# Patient Record
Sex: FEMALE | Race: WHITE
[De-identification: names, ages, dates, MRNs, and addresses within clinical notes are randomized per-mention and may not be internally consistent; named-entity substitution may affect disease eponyms.]

---

## 2024-07-22 PROBLEM — Z00.00 ENCOUNTER FOR PREVENTIVE HEALTH EXAMINATION: Status: ACTIVE | Noted: 2024-07-22

## 2024-07-23 ENCOUNTER — APPOINTMENT (OUTPATIENT)
Dept: NEUROLOGY | Facility: CLINIC | Age: 44
End: 2024-07-23
Payer: MEDICAID

## 2024-07-23 VITALS
HEIGHT: 58 IN | SYSTOLIC BLOOD PRESSURE: 123 MMHG | DIASTOLIC BLOOD PRESSURE: 88 MMHG | OXYGEN SATURATION: 99 % | WEIGHT: 183 LBS | HEART RATE: 76 BPM | BODY MASS INDEX: 38.41 KG/M2

## 2024-07-23 DIAGNOSIS — R56.9 UNSPECIFIED CONVULSIONS: ICD-10-CM

## 2024-07-23 DIAGNOSIS — E66.9 OBESITY, UNSPECIFIED: ICD-10-CM

## 2024-07-23 DIAGNOSIS — Z86.73 PERSONAL HISTORY OF TRANSIENT ISCHEMIC ATTACK (TIA), AND CEREBRAL INFARCTION W/OUT RESIDUAL DEFICITS: ICD-10-CM

## 2024-07-23 DIAGNOSIS — R41.0 DISORIENTATION, UNSPECIFIED: ICD-10-CM

## 2024-07-23 DIAGNOSIS — I10 ESSENTIAL (PRIMARY) HYPERTENSION: ICD-10-CM

## 2024-07-23 DIAGNOSIS — G44.86 CERVICOGENIC HEADACHE: ICD-10-CM

## 2024-07-23 PROCEDURE — G2211 COMPLEX E/M VISIT ADD ON: CPT | Mod: NC,1L

## 2024-07-23 PROCEDURE — 99205 OFFICE O/P NEW HI 60 MIN: CPT

## 2024-07-23 RX ORDER — METHYLPREDNISOLONE 4 MG/1
4 TABLET ORAL
Qty: 1 | Refills: 0 | Status: ACTIVE | COMMUNITY
Start: 2024-07-23 | End: 1900-01-01

## 2024-07-23 RX ORDER — AMLODIPINE AND VALSARTAN 10; 160 MG/1; MG/1
10-160 TABLET, FILM COATED ORAL DAILY
Qty: 30 | Refills: 0 | Status: ACTIVE | COMMUNITY
Start: 2024-07-23

## 2024-07-23 RX ORDER — METHOCARBAMOL 500 MG/1
500 TABLET, FILM COATED ORAL
Qty: 60 | Refills: 0 | Status: ACTIVE | COMMUNITY
Start: 2024-07-23 | End: 1900-01-01

## 2024-07-23 RX ORDER — SEMAGLUTIDE 0.68 MG/ML
2 INJECTION, SOLUTION SUBCUTANEOUS WEEKLY
Qty: 4 | Refills: 0 | Status: ACTIVE | COMMUNITY
Start: 2024-07-23

## 2024-07-23 NOTE — ASSESSMENT
[FreeTextEntry1] : Complex case with multiple prior diagnoses of seizures, parkinsonism, migraines and apparently prior strokes, however pt and family seeking second opinion. Confusing picture somewhat, with somewhat poor health literacy about issues.   # Seizure-like events - Reported events seem more concerning for epilepsy - but need further testing - REEG - AEEG 48H - Most likely would need EMU at some point depending on above - Low threshold to start ASMs - would opt for Lacosamide vs. Keppra - PT ADVISED not to drive or operate any heavy machinery at this time due to lacking diagnostic clarity - she is a  - states she is not on active duty at this time.   #Hx of multiple embolic strokes s/p PFO closure - Obtain MRI brain WAW to evaluated prior strokes, to look for recent ones as well as to assess potential seizure nidus. CT head would be of limited clinical yield. - consider ASA/statin next visit.as well GDMT  #Cervicogenic spasm w/ HA -- overlying likely medication overuse HA- pt taking multiple rounds of daily Tylenol - STOP OTC APAP/NSAIDS at this time - Medrol dose pack - Methocarbamol 500 qhs - add additional PRN dose daytime if tolerating - Keep HA diary - neck exercises, posture correction advised, massage and heat therapy advised - PT eval given  Extensive education and counseling done as per my usual protocol - relevant to the neurological issues above. Patient's and family's questions and concerns were addressed, they voiced understanding.  Total time spent on the day of the visit, including pre-visit and post-visit time was 65 minutes.

## 2024-07-23 NOTE — REASON FOR VISIT
[Initial Evaluation] : an initial evaluation [Family Member] : family member [FreeTextEntry1] : HAs, seizure-like events, hx of stroke

## 2024-07-23 NOTE — PHYSICAL EXAM
[FreeTextEntry1] : NEUROLOGIC EXAM:  MENTAL STATUS: Alert and Oriented to person, place and time. Speech is fluent, without aphasia or dysarthria. Able to name, repeat and follow commands. Behavior and affect appropriate to situation.       Head AT/NM neck, spastic SCMS, tender subocc groove and traps, reproducible pain bifrontally.                   CRANIAL NERVES: CN 2:    Visual fields appear full to confrontation OU CN 3, 4, 6: Extraocular movements are intact. No nystagmus or ophthalmoplegia is evident. Pupils are equally round CN 5:     Facial sensation is intact to light touch in all 3 divisions CN 7:     Facial excursion is full and symmetric bilaterally.  MOTOR: Bilateral upper extremities are antigravity without orbiting or drift. bilateral lower extremities are full range of motion without drift.  SENSORY: Intact to light touch perception in all four extremities  COORD: Finger to nose testing without dysmetria bilaterally.  GAIT: Normal station and gait.

## 2024-07-23 NOTE — HISTORY OF PRESENT ILLNESS
[FreeTextEntry1] : This is a 44-year-old female, medical history of hypertension, obesity, anxiety (not on meds), reported history of ischemic stroke thought to be secondary to PFO s/p closure in 2015 - Here for evaluation of multiple neurological diagnoses - Accompanied by daughter as well as her own mother.. Patient states in 2015 she had stroke-like symptoms with right facial palsy possibly left sided weakness, went to the hospital and was diagnosed with multiple small strokes, found to have PFO which was later closed.  Since her stroke she developed variable neurological presentations some questionable seizure-like activity with facial jerking mostly on the right left gaze deviation right arm extension quite stereotyped-last event 2 weeks ago and before that major event in 2019 and couple more every year. These events have also been associated with lip laceration and urinary incontinence on some occasions.  She was following with [facility] neurology and was started on Keppra 1 g twice a day at 1 point.  Patient reports this was the time she felt most clearheaded and did not have any seizure-like events.  She was also having occipital predominant headaches neck spasm for which she was started also on topiramate as well.  With topiramate she did have some cognitive issues and did not tolerated. At some point given the seizure-like events with forced neck twisting Described as "exorcism" by family patient was also switched to Sinemet with possible concern for parkinsonism and taken off Keppra.  Patient had a lot of adverse side effects to Sinemet and no real benefit.  She is now here at the behest of her daughter to get a fresh evaluation of her all her issues.  Patient also endorses issues with memory especially short-term. She denies any terry LOC. Denies history of serious head injuries/TBI, CNS infection.  Denies history of epilepsy and family. Daughter reports patient has not been the best patient and has stopped medications on her own however now she is motivated to get better with daughter's encouragement. Patient also reports near daily headache for which she takes 6 to 8 tablets of Tylenol every single day despite the fact that it is not helping her.  Has not tried anything else besides occasional Motrin which also does not help.  Patient reports longstanding history of snoring since her teenage years and apparently had a sleep study in 2018 that was negative for DUNCAN.  ROS otherwise neg

## 2024-07-25 ENCOUNTER — APPOINTMENT (OUTPATIENT)
Dept: NEUROLOGY | Facility: CLINIC | Age: 44
End: 2024-07-25

## 2024-08-08 ENCOUNTER — APPOINTMENT (OUTPATIENT)
Dept: NEUROLOGY | Facility: CLINIC | Age: 44
End: 2024-08-08

## 2024-08-09 ENCOUNTER — APPOINTMENT (OUTPATIENT)
Dept: NEUROLOGY | Facility: CLINIC | Age: 44
End: 2024-08-09

## 2024-08-20 ENCOUNTER — APPOINTMENT (OUTPATIENT)
Dept: NEUROLOGY | Facility: CLINIC | Age: 44
End: 2024-08-20